# Patient Record
Sex: MALE | Race: WHITE | NOT HISPANIC OR LATINO | Employment: OTHER | URBAN - NONMETROPOLITAN AREA
[De-identification: names, ages, dates, MRNs, and addresses within clinical notes are randomized per-mention and may not be internally consistent; named-entity substitution may affect disease eponyms.]

---

## 2022-04-20 ENCOUNTER — OFFICE VISIT (OUTPATIENT)
Dept: SURGERY | Facility: CLINIC | Age: 82
End: 2022-04-20
Attending: SURGERY
Payer: MEDICARE

## 2022-04-20 VITALS
HEART RATE: 79 BPM | OXYGEN SATURATION: 97 % | RESPIRATION RATE: 18 BRPM | TEMPERATURE: 98 F | HEIGHT: 72 IN | WEIGHT: 231 LBS | DIASTOLIC BLOOD PRESSURE: 49 MMHG | BODY MASS INDEX: 31.29 KG/M2 | SYSTOLIC BLOOD PRESSURE: 142 MMHG

## 2022-04-20 DIAGNOSIS — Z12.11 SCREENING FOR MALIGNANT NEOPLASM OF COLON: Primary | ICD-10-CM

## 2022-04-20 PROCEDURE — 99204 OFFICE O/P NEW MOD 45 MIN: CPT | Mod: S$PBB,,, | Performed by: SURGERY

## 2022-04-20 PROCEDURE — 99204 OFFICE O/P NEW MOD 45 MIN: CPT | Mod: PBBFAC | Performed by: SURGERY

## 2022-04-20 PROCEDURE — 99204 PR OFFICE/OUTPT VISIT, NEW, LEVL IV, 45-59 MIN: ICD-10-PCS | Mod: S$PBB,,, | Performed by: SURGERY

## 2022-04-20 RX ORDER — AMOXICILLIN 500 MG
CAPSULE ORAL DAILY
COMMUNITY

## 2022-04-20 RX ORDER — ASCORBIC ACID 500 MG
500 TABLET ORAL DAILY
COMMUNITY

## 2022-04-20 RX ORDER — POTASSIUM CHLORIDE 20 MEQ/1
20 TABLET, EXTENDED RELEASE ORAL 2 TIMES DAILY
COMMUNITY

## 2022-04-20 RX ORDER — CHOLECALCIFEROL (VITAMIN D3) 25 MCG
1000 TABLET ORAL DAILY
COMMUNITY

## 2022-04-20 RX ORDER — ALLOPURINOL 100 MG/1
100 TABLET ORAL DAILY
COMMUNITY

## 2022-04-20 RX ORDER — VITAMIN B COMPLEX
1 CAPSULE ORAL DAILY
COMMUNITY

## 2022-04-20 RX ORDER — SODIUM CHLORIDE, SODIUM LACTATE, POTASSIUM CHLORIDE, CALCIUM CHLORIDE 600; 310; 30; 20 MG/100ML; MG/100ML; MG/100ML; MG/100ML
INJECTION, SOLUTION INTRAVENOUS CONTINUOUS
Status: CANCELLED | OUTPATIENT
Start: 2022-04-20

## 2022-04-20 RX ORDER — TRIAMTERENE AND HYDROCHLOROTHIAZIDE 75; 50 MG/1; MG/1
1 TABLET ORAL DAILY
COMMUNITY

## 2022-04-20 RX ORDER — DIGOXIN 125 MCG
125 TABLET ORAL DAILY
COMMUNITY

## 2022-04-20 RX ORDER — FELODIPINE 5 MG/1
5 TABLET, EXTENDED RELEASE ORAL DAILY
COMMUNITY

## 2022-04-20 RX ORDER — CAPTOPRIL 100 MG/1
25 TABLET ORAL 2 TIMES DAILY
COMMUNITY

## 2022-04-20 RX ORDER — DICYCLOMINE HYDROCHLORIDE 20 MG/1
20 TABLET ORAL 2 TIMES DAILY
COMMUNITY

## 2022-04-20 RX ORDER — HYDROCODONE BITARTRATE AND ACETAMINOPHEN 7.5; 325 MG/1; MG/1
1 TABLET ORAL EVERY 6 HOURS PRN
COMMUNITY

## 2022-04-20 RX ORDER — WARFARIN 7.5 MG/1
7.5 TABLET ORAL DAILY
COMMUNITY

## 2022-04-20 NOTE — PROGRESS NOTES
General Surgery History and Physical      Patient ID: Jesus Lane is a 81 y.o. male.    Chief Complaint: No chief complaint on file.      HPI:  81-year-old male who last:  A colonoscopy 5 years ago.  This was done right after he had a low anterior resection with temporary colostomy for he describes as a colorectal cancer.  Has some occasional bleeding from below likely when he strains possibly due to irritation from colitis.  No nausea or vomiting no other symptoms complaints.  No family history of colon cancer.    Review of Systems   Constitutional: Negative for activity change, appetite change, fatigue and fever.   HENT: Negative for trouble swallowing.    Respiratory: Negative for cough and shortness of breath.    Cardiovascular: Negative for chest pain and palpitations.   Gastrointestinal: Positive for anal bleeding. Negative for abdominal distention, abdominal pain, blood in stool, constipation, diarrhea and rectal pain.   Genitourinary: Negative for flank pain.   Musculoskeletal: Negative for neck pain and neck stiffness.   Neurological: Negative for weakness.       Current Outpatient Medications   Medication Sig Dispense Refill    allopurinoL (ZYLOPRIM) 100 MG tablet Take 100 mg by mouth once daily.      ascorbic acid, vitamin C, (VITAMIN C) 500 MG tablet Take 500 mg by mouth once daily.      b complex vitamins capsule Take 1 capsule by mouth once daily.      captopriL (CAPOTEN) 100 MG Tab Take 25 mg by mouth 2 (two) times daily.      dicyclomine (BENTYL) 20 mg tablet Take 20 mg by mouth 2 (two) times daily.      digoxin (LANOXIN) 125 mcg tablet Take 125 mcg by mouth once daily.      felodipine (PLENDIL) 5 MG 24 hr tablet Take 5 mg by mouth once daily.      HYDROcodone-acetaminophen (NORCO) 7.5-325 mg per tablet Take 1 tablet by mouth every 6 (six) hours as needed for Pain.      multivitamin capsule Take 1  capsule by mouth once daily.      omega-3 fatty acids/fish oil (FISH OIL-OMEGA-3 FATTY ACIDS) 300-1,000 mg capsule Take by mouth once daily.      potassium chloride SA (K-DUR,KLOR-CON) 20 MEQ tablet Take 20 mEq by mouth 2 (two) times daily.      triamterene-hydrochlorothiazide 75-50 mg (MAXZIDE) 75-50 mg per tablet Take 1 tablet by mouth once daily.      vitamin D (VITAMIN D3) 1000 units Tab Take 1,000 Units by mouth once daily.      warfarin (COUMADIN) 7.5 MG tablet Take 7.5 mg by mouth once daily.       No current facility-administered medications for this visit.       Review of patient's allergies indicates:  No Known Allergies    No past medical history on file.    No past surgical history on file.    No family history on file.    Social History     Tobacco Use    Smoking status: Never Smoker    Smokeless tobacco: Never Used   Substance and Sexual Activity    Alcohol use: Yes     Alcohol/week: 2.0 standard drinks     Types: 2 Shots of liquor per week    Drug use: Never       Vitals:    04/20/22 1459   BP: (!) 142/49   Pulse: 79   Resp: 18   Temp: 97.9 °F (36.6 °C)       Physical Exam  Constitutional:       General: He is not in acute distress.  HENT:      Head: Normocephalic.   Cardiovascular:      Rate and Rhythm: Normal rate and regular rhythm.      Pulses: Normal pulses.   Pulmonary:      Effort: Pulmonary effort is normal. No respiratory distress.      Breath sounds: Normal breath sounds.   Abdominal:      General: Abdomen is flat. There is no distension.      Palpations: Abdomen is soft.      Tenderness: There is no abdominal tenderness.   Musculoskeletal:         General: Normal range of motion.   Skin:     General: Skin is warm.   Neurological:      General: No focal deficit present.      Mental Status: He is oriented to person, place, and time.         Assessment & Plan:    Screening for malignant neoplasm of colon         Patient to go to the operating room for a colonoscopy. Risks and benefits  explained to the patient including risk of bleeding, infection, missed lesion, perforation, and possible need for additional operation. All questions were answered.

## 2022-05-16 ENCOUNTER — LAB VISIT (OUTPATIENT)
Dept: LAB | Facility: HOSPITAL | Age: 82
End: 2022-05-16
Attending: SURGERY
Payer: MEDICARE

## 2022-05-16 DIAGNOSIS — Z12.11 SCREENING FOR MALIGNANT NEOPLASM OF COLON: ICD-10-CM

## 2022-05-16 LAB — SARS-COV+SARS-COV-2 AG RESP QL IA.RAPID: NEGATIVE

## 2022-05-16 PROCEDURE — 87426 SARSCOV CORONAVIRUS AG IA: CPT

## 2022-05-18 ENCOUNTER — ANESTHESIA EVENT (OUTPATIENT)
Dept: GASTROENTEROLOGY | Facility: HOSPITAL | Age: 82
End: 2022-05-18
Payer: MEDICARE

## 2022-05-18 ENCOUNTER — ANESTHESIA (OUTPATIENT)
Dept: GASTROENTEROLOGY | Facility: HOSPITAL | Age: 82
End: 2022-05-18
Payer: MEDICARE

## 2022-05-18 ENCOUNTER — HOSPITAL ENCOUNTER (OUTPATIENT)
Dept: GASTROENTEROLOGY | Facility: HOSPITAL | Age: 82
Discharge: HOME | End: 2022-05-18
Attending: SURGERY
Payer: MEDICARE

## 2022-05-18 VITALS
WEIGHT: 235 LBS | RESPIRATION RATE: 14 BRPM | OXYGEN SATURATION: 99 % | SYSTOLIC BLOOD PRESSURE: 111 MMHG | HEIGHT: 72 IN | HEART RATE: 66 BPM | TEMPERATURE: 98 F | BODY MASS INDEX: 31.83 KG/M2 | DIASTOLIC BLOOD PRESSURE: 55 MMHG

## 2022-05-18 DIAGNOSIS — Z12.11 SCREENING FOR MALIGNANT NEOPLASM OF COLON: ICD-10-CM

## 2022-05-18 PROCEDURE — 25000003 PHARM REV CODE 250: Performed by: NURSE ANESTHETIST, CERTIFIED REGISTERED

## 2022-05-18 PROCEDURE — G0121 COLON CA SCRN NOT HI RSK IND: HCPCS

## 2022-05-18 PROCEDURE — G0105 COLORECTAL SCRN; HI RISK IND: HCPCS | Mod: ,,, | Performed by: SURGERY

## 2022-05-18 PROCEDURE — G0105 COLORECTAL SCRN; HI RISK IND: HCPCS

## 2022-05-18 PROCEDURE — 37000008 HC ANESTHESIA 1ST 15 MINUTES

## 2022-05-18 PROCEDURE — D9220A PRA ANESTHESIA: ICD-10-PCS | Mod: ,,, | Performed by: NURSE ANESTHETIST, CERTIFIED REGISTERED

## 2022-05-18 PROCEDURE — 63600175 PHARM REV CODE 636 W HCPCS: Performed by: SURGERY

## 2022-05-18 PROCEDURE — D9220A PRA ANESTHESIA: Mod: ,,, | Performed by: NURSE ANESTHETIST, CERTIFIED REGISTERED

## 2022-05-18 PROCEDURE — G0105 COLORECTAL SCRN; HI RISK IND: ICD-10-PCS | Mod: ,,, | Performed by: SURGERY

## 2022-05-18 PROCEDURE — 63600175 PHARM REV CODE 636 W HCPCS: Performed by: NURSE ANESTHETIST, CERTIFIED REGISTERED

## 2022-05-18 RX ORDER — LIDOCAINE HYDROCHLORIDE 20 MG/ML
INJECTION, SOLUTION EPIDURAL; INFILTRATION; INTRACAUDAL; PERINEURAL
Status: DISCONTINUED | OUTPATIENT
Start: 2022-05-18 | End: 2022-05-18

## 2022-05-18 RX ORDER — PROPOFOL 10 MG/ML
VIAL (ML) INTRAVENOUS
Status: DISCONTINUED | OUTPATIENT
Start: 2022-05-18 | End: 2022-05-18

## 2022-05-18 RX ORDER — SODIUM CHLORIDE, SODIUM LACTATE, POTASSIUM CHLORIDE, CALCIUM CHLORIDE 600; 310; 30; 20 MG/100ML; MG/100ML; MG/100ML; MG/100ML
INJECTION, SOLUTION INTRAVENOUS CONTINUOUS
Status: DISCONTINUED | OUTPATIENT
Start: 2022-05-18 | End: 2022-05-19 | Stop reason: HOSPADM

## 2022-05-18 RX ADMIN — PROPOFOL 100 MG: 10 INJECTION, EMULSION INTRAVENOUS at 01:05

## 2022-05-18 RX ADMIN — PROPOFOL 50 MG: 10 INJECTION, EMULSION INTRAVENOUS at 01:05

## 2022-05-18 RX ADMIN — LIDOCAINE HYDROCHLORIDE 50 MG: 20 INJECTION, SOLUTION EPIDURAL; INFILTRATION; INTRACAUDAL; PERINEURAL at 01:05

## 2022-05-18 RX ADMIN — SODIUM CHLORIDE, POTASSIUM CHLORIDE, SODIUM LACTATE AND CALCIUM CHLORIDE: 600; 310; 30; 20 INJECTION, SOLUTION INTRAVENOUS at 11:05

## 2022-05-18 NOTE — TRANSFER OF CARE
Anesthesia Transfer of Care Note    Patient: Jesus Lane    Procedure(s) Performed: * colonoscopy *    Patient location: GI    Anesthesia Type: general    Transport from OR: Transported from OR on room air with adequate spontaneous ventilation    Post pain: adequate analgesia    Post assessment: no apparent anesthetic complications    Post vital signs: stable    Level of consciousness: responds to stimulation    Nausea/Vomiting: no nausea/vomiting    Complications: none    Transfer of care protocol was followed      Last vitals:   Visit Vitals  BP (!) 104/55   Pulse 79   Temp 36.5 °C (97.7 °F)   Resp 16   Ht 6' (1.829 m)   Wt 106.6 kg (235 lb)   SpO2 100%   BMI 31.87 kg/m²

## 2022-05-18 NOTE — H&P
General Surgery History and Physical        Patient ID: Jesus Lane is a 81 y.o. male.     Chief Complaint: No chief complaint on file.        HPI:  81-year-old male who last:  A colonoscopy 5 years ago.  This was done right after he had a low anterior resection with temporary colostomy for he describes as a colorectal cancer.  Has some occasional bleeding from below likely when he strains possibly due to irritation from colitis.  No nausea or vomiting no other symptoms complaints.  No family history of colon cancer.     Review of Systems   Constitutional: Negative for activity change, appetite change, fatigue and fever.   HENT: Negative for trouble swallowing.    Respiratory: Negative for cough and shortness of breath.    Cardiovascular: Negative for chest pain and palpitations.   Gastrointestinal: Positive for anal bleeding. Negative for abdominal distention, abdominal pain, blood in stool, constipation, diarrhea and rectal pain.   Genitourinary: Negative for flank pain.   Musculoskeletal: Negative for neck pain and neck stiffness.   Neurological: Negative for weakness.         Current Medications          Current Outpatient Medications   Medication Sig Dispense Refill    allopurinoL (ZYLOPRIM) 100 MG tablet Take 100 mg by mouth once daily.        ascorbic acid, vitamin C, (VITAMIN C) 500 MG tablet Take 500 mg by mouth once daily.        b complex vitamins capsule Take 1 capsule by mouth once daily.        captopriL (CAPOTEN) 100 MG Tab Take 25 mg by mouth 2 (two) times daily.        dicyclomine (BENTYL) 20 mg tablet Take 20 mg by mouth 2 (two) times daily.        digoxin (LANOXIN) 125 mcg tablet Take 125 mcg by mouth once daily.        felodipine (PLENDIL) 5 MG 24 hr tablet Take 5 mg by mouth once daily.        HYDROcodone-acetaminophen (NORCO) 7.5-325 mg per tablet Take 1 tablet by mouth every 6 (six) hours as needed for Pain.        multivitamin capsule Take 1 capsule by mouth once daily.         omega-3 fatty acids/fish oil (FISH OIL-OMEGA-3 FATTY ACIDS) 300-1,000 mg capsule Take by mouth once daily.        potassium chloride SA (K-DUR,KLOR-CON) 20 MEQ tablet Take 20 mEq by mouth 2 (two) times daily.        triamterene-hydrochlorothiazide 75-50 mg (MAXZIDE) 75-50 mg per tablet Take 1 tablet by mouth once daily.        vitamin D (VITAMIN D3) 1000 units Tab Take 1,000 Units by mouth once daily.        warfarin (COUMADIN) 7.5 MG tablet Take 7.5 mg by mouth once daily.          No current facility-administered medications for this visit.            Review of patient's allergies indicates:  No Known Allergies     No past medical history on file.     No past surgical history on file.     No family history on file.     Social History               Tobacco Use    Smoking status: Never Smoker    Smokeless tobacco: Never Used   Substance and Sexual Activity    Alcohol use: Yes       Alcohol/week: 2.0 standard drinks       Types: 2 Shots of liquor per week    Drug use: Never                Vitals:     04/20/22 1459   BP: (!) 142/49   Pulse: 79   Resp: 18   Temp: 97.9 °F (36.6 °C)         Physical Exam  Constitutional:       General: He is not in acute distress.  HENT:      Head: Normocephalic.   Cardiovascular:      Rate and Rhythm: Normal rate and regular rhythm.      Pulses: Normal pulses.   Pulmonary:      Effort: Pulmonary effort is normal. No respiratory distress.      Breath sounds: Normal breath sounds.   Abdominal:      General: Abdomen is flat. There is no distension.      Palpations: Abdomen is soft.      Tenderness: There is no abdominal tenderness.   Musculoskeletal:         General: Normal range of motion.   Skin:     General: Skin is warm.   Neurological:      General: No focal deficit present.      Mental Status: He is oriented to person, place, and time.          Assessment & Plan:     Screening for malignant neoplasm of colon          Patient to go to the operating room for a colonoscopy.  Risks and benefits explained to the patient including risk of bleeding, infection, missed lesion, perforation, and possible need for additional operation. All questions were answered.

## 2022-05-18 NOTE — ANESTHESIA PREPROCEDURE EVALUATION
05/18/2022  Jesus Lane is a 81 y.o., male.      Pre-op Assessment    I have reviewed the Patient Summary Reports.     I have reviewed the Nursing Notes. I have reviewed the NPO Status.   I have reviewed the Medications.     Review of Systems  Anesthesia Hx:  No problems with previous Anesthesia    Social:  Alcohol Use    Hematology/Oncology:  Hematology Normal       -- Cancer in past history:  Other (see Oncology comments) chemotherapy and surgery  Oncology Comments: Hx colon ca     EENT/Dental:EENT/Dental Normal   Cardiovascular:   Hypertension, well controlled Dysrhythmias atrial fibrillation    Pulmonary:  Pulmonary Normal    Renal/:  Renal/ Normal     Hepatic/GI:   Bowel Prep.    Musculoskeletal:   Arthritis     Neurological:   Chronic Pain Syndrome   Endocrine:  Obesity / BMI > 30  Psych:  Psychiatric Normal         Anticoagulant long-term use Hypertension   Paroxysmal atrial fibrillation Arthritis       Surgical History    AMPUTATION OF REPLICATED TOES COLON SURGERY         Physical Exam  General: Well nourished, Cooperative, Alert and Oriented    Airway:  Mallampati: II   Mouth Opening: Normal  TM Distance: Normal  Tongue: Normal  Neck ROM: Normal ROM    Dental:  Intact        Anesthesia Plan  Type of Anesthesia, risks & benefits discussed:    Anesthesia Type: Gen Natural Airway  Intra-op Monitoring Plan: Standard ASA Monitors  Post Op Pain Control Plan: multimodal analgesia  Induction:  IV  Informed Consent: Informed consent signed with the Patient and all parties understand the risks and agree with anesthesia plan.  All questions answered. Patient consented to blood products? Yes  ASA Score: 3  Day of Surgery Review of History & Physical: I have interviewed and examined the patient. I have reviewed the patient's H&P dated:     Ready For Surgery From Anesthesia Perspective.     .

## 2022-05-18 NOTE — ANESTHESIA POSTPROCEDURE EVALUATION
Anesthesia Post Evaluation    Patient: Jesus Lane    Procedure(s) Performed: *colonoscopy *    Final Anesthesia Type: general      Patient location during evaluation: GI PACU  Patient participation: Yes- Able to Participate  Level of consciousness: awake and alert  Post-procedure vital signs: reviewed and stable  Pain management: adequate  Airway patency: patent    PONV status at discharge: No PONV  Anesthetic complications: no      Cardiovascular status: blood pressure returned to baseline and hemodynamically stable  Respiratory status: spontaneous ventilation  Hydration status: euvolemic  Follow-up not needed.          Vitals Value Taken Time   /55 05/18/22 1355   Temp 97.6f 05/18/22 1420   Pulse 66 05/18/22 1355   Resp 14 05/18/22 1355   SpO2 99 % 05/18/22 1355         Event Time   Out of Recovery 14:01:00         Pain/Brad Score: Brad Score: 10 (5/18/2022  2:00 PM)

## 2022-05-18 NOTE — PLAN OF CARE
1331  Pt to pacu pt resp reg and non labored pt pain level 0 sao2 99% on ra   1400 pt vs stable pt up to side of bed to get dressed pt to exit via wheelchair pt release to family

## 2023-04-19 DIAGNOSIS — R26.89 BALANCE PROBLEMS: ICD-10-CM

## 2023-04-19 DIAGNOSIS — R26.9 GAIT DIFFICULTY: Primary | ICD-10-CM

## 2023-04-26 ENCOUNTER — CLINICAL SUPPORT (OUTPATIENT)
Dept: REHABILITATION | Facility: HOSPITAL | Age: 83
End: 2023-04-26
Payer: MEDICARE

## 2023-04-26 DIAGNOSIS — R26.9 GAIT DIFFICULTY: ICD-10-CM

## 2023-04-26 DIAGNOSIS — R26.89 BALANCE PROBLEMS: ICD-10-CM

## 2023-04-26 PROCEDURE — 97161 PT EVAL LOW COMPLEX 20 MIN: CPT | Mod: PN

## 2023-04-26 PROCEDURE — 97110 THERAPEUTIC EXERCISES: CPT | Mod: PN

## 2023-04-26 NOTE — PLAN OF CARE
[facility]   Physical Therapy Initial Evaluation    Date: 4/26/2023   Name: Jesus Lane  Clinic Number: [id number]    Therapy Diagnosis:   Encounter Diagnoses   Name Primary?    Gait difficulty     Balance problems      Physician: Vania Herbert MD    Physician Orders: PT Eval and Treat    Medical Diagnosis from Referral: gait and balance disturbance.   Evaluation Date: 4/26/2023  Updated Plan of Care Due : 6/14/2023  Authorization Period Expiration: medicare   Plan of Care Expiration: end of year   Visit # / Visits authorized: 1/ 20    Time In: 1300  Time Out: 1400  Total Appointment Time (timed & untimed codes): 50 minutes    Precautions: Standard    Subjective   Date of onset: pt states he has been having balance and weakness problems . Pt voices one fall in the last month when going to the bathroom.  Pt states he has difficulty clearing his toes when walking. Pt states  difficulty with walking down hills and up hills. Pt voices he has neuropathy and has weakness in his ankles .  Pt voices he has been using a walking stick about 2 years ago.  Pt voices no pain except left hip and shoulder arthritis pain.      History of current condition - JESUS reports: hx below      Medical History:   Past Medical History:   Diagnosis Date    Anticoagulant long-term use     Arthritis     Hypertension     Paroxysmal atrial fibrillation        Surgical History:   Jesus Lane  has a past surgical history that includes Amputation of replicated toes and Colon surgery.    Medications:   Jesus has a current medication list which includes the following prescription(s): allopurinol, ascorbic acid (vitamin c), b complex vitamins, captopril, dicyclomine, digoxin, felodipine, hydrocodone-acetaminophen, multivitamin, fish oil-omega-3 fatty acids, potassium chloride sa, triamterene-hydrochlorothiazide 75-50 mg, vitamin d, and warfarin.    Allergies:   Review of patient's allergies indicates:  No Known Allergies     Imaging,  none:      Prior Therapy: none   Social History:   lives with their spouse  Occupation: retired   Prior Level of Function: independent with cane .    Current Level of Function: pt states he has decreased range of motion and strength with decreased balance .      Pain:  Current 0/10, worst 0/10, best 0/10   Location: bilateral lower extremity strength        Description: n/a   Aggravating Factors: Sitting, Standing, Walking, and decreased balance   Easing Factors: rest    Patients goals: be able to go camping and not have difficulty with gait     Objective         Observation :              Range of Motion/Strength :                  Left Extremity                                                                        Right Extremity   AROM PROM Strength  Location  AROM    PROM   Strength   90  3+   Hip      Flexion 90  3+   -10  3               Extension -10  3   25  3               Internal Rotation (Prone) 25  3   40  3               External Rotation (Prone) 40  3   8  3               Abduction 8  3                     125  3+   Knee    Flexion 125  3+   -10  3+                Extension quad lag  -10  3+   -6 5 3   Ankle   Dorsiflexion -8 5 3   40  3                Plantar Flexion 40  3   15  3                Inversion 15  3   10  3                Eversion 10  3                  Functional Impairments :  decreased knee range of motion and strength      Limitation/Restriction for FOTO knee Survey    Therapist reviewed FOTO scores for Kellie Lane on 4/26/2023.   FOTO documents entered into [employer] - see Media section.    Limitation Score: 47%         TREATMENT         KELLIE received the treatments listed below:  THERAPEUTIC EXERCISES to develop strength, endurance, ROM, flexibility, and posture for 20 minutes including home ex program         Home Exercises and Patient Education Provided    Education provided:   - Pt performed and received home ex program.     Written Home Exercises Provided: yes.  Exercises were  reviewed and KELLIE was able to demonstrate them prior to the end of the session.  KELLIE demonstrated good  understanding of the education provided.     See EMR under Patient Instructions for exercises provided prior visit.    Assessment   Kellie is a 82 y.o. male referred to outpatient Physical Therapy with a medical diagnosis of decreased balance and strength . Patient presents with decreased hip and ankle strength.     Patient prognosis is Excellent.   Patientt will benefit from skilled outpatient Physical Therapy to address the deficits stated above and in the chart below, provide patient /family education, and to maximize patientt's level of independence.     Plan of care discussed with patient: Yes  Patient's spiritual, cultural and educational needs considered and patient is agreeable to the plan of care and goals as stated below:     Anticipated Barriers for therapy: decreased balance and strength . Patient presents with decreased hip and ankle strength.   Goals:  Short Term Goals: 4 weeks   Pt will increase bilateral dorsiflexion 10 degrees   Pt will be able to increase bilateral hip abduction to 10 degrees   Pt will be able to perform unilateral standing 5 sec independently   Increase hip extension to 10 degrees bilaterally  Increase hip and dorsiflexion strength to 3+5      Long Term Goals: 8 weeks   Pt will be able to amublate/ stand 30 min without fatigue   Be able to shop x 45 min     Plan   Plan of care Certification: 4/26/2023 to 6/14/2023.    Outpatient Physical Therapy 2 times weekly for 6 weeks to include the following interventions: Manual Therapy, Patient Education, Therapeutic Activities, and Therapeutic Exercise. , neuromuscular re education    Plan of care has been reestablished with Daphnie Nur LPTA, Jess Mayers LPTA, Kelsea Palacios LPTA  and Jasmina Winkler LPTA.       Uri Conway, PT            I CERTIFY THE NEED FOR THESE SERVICES FURNISHED UNDER THIS PLAN OF TREATMENT AND WHILE UNDER MY  CARE.    Physician's comments:      Physician's Signature: ___________________________________________________

## 2023-04-28 ENCOUNTER — CLINICAL SUPPORT (OUTPATIENT)
Dept: REHABILITATION | Facility: HOSPITAL | Age: 83
End: 2023-04-28
Payer: MEDICARE

## 2023-04-28 DIAGNOSIS — R26.89 BALANCE PROBLEMS: Primary | ICD-10-CM

## 2023-04-28 PROCEDURE — 97110 THERAPEUTIC EXERCISES: CPT | Mod: PN

## 2023-04-28 NOTE — PROGRESS NOTES
Physical Therapy Treatment Note     Name: Jesus Lane  Clinic Number: [id number]    Therapy Diagnosis:   Encounter Diagnosis   Name Primary?    Balance problems Yes     Physician: Vania Herbert MD    Visit Date: 4/28/2023  Physician Orders: PT Eval and Treat    Medical Diagnosis from Referral: gait and balance disturbance.   Evaluation Date: 4/26/2023  Updated Plan of Care Due : 6/14/2023  Authorization Period Expiration: medicare   Plan of Care Expiration: end of year   Visit # / Visits authorized: 1/ 20  PTA Visit #: 0    Time In: 1428  Time Out: 1515  Total Billable Time: 45 minutes    Precautions: Standard       Subjective     Pt reports: pt voices he was a little sore after doing home ex program .  He was compliant with home exercise program.       Pain: 2/10  Location: bilateral lower extremity neuropathy       Objective     JESUS received therapeutic exercises to develop strength, endurance, ROM, flexibility, and posture for 45  minutes including:     Bike  Level                                                                    X 15minutes  Total gym level 10   Double support  Leg press                20 reps  Total gym level 10      Double support calf press                     20 reps  Bilateral leg press                                                                     70 lb x 25 reps  Bilateral calf press                                                                    70 lb x 20 reps  Hip flexion x 10 reps bilateral 20lb   Hip abduction x 10 reps  bilateral  20lb    Bilateral ankle dorisflexion x 10 reps tband red   Bilateral    eversion x 10reps      tband   Bilateral inversion x qo reps red tband                Prone ext x 10 reps   Iliopsoas stretch x 5 reps bilateral                                                  ROM    dorsiflexion       right    10   left    8               Inversion           right    25       left   15                         Hip flexion     right  98    left   93    :             Home Exercises Provided and Patient Education Provided     Education provided: cont home ex program     Written Home Exercises Provided: Patient instructed to cont prior HEP.  Exercises were reviewed and KELLIE was able to demonstrate them prior to the end of the session.  KELLIE demonstrated good  understanding of the education provided.     See EMR under Patient Instructions for exercises provided prior visit.    Assessment     Pt has improved range of motion today   KELLIE Is progressing well towards his goals.   Pt prognosis is Excellent.     Pt will continue to benefit from skilled outpatient physical therapy to address the deficits listed in the problem list box on initial evaluation, provide pt/family education and to maximize pt's level of independence in the home and community environment.     Pt's spiritual, cultural and educational needs considered and pt agreeable to plan of care and goals.     Anticipated Barriers for therapy: decreased balance and strength . Patient presents with decreased hip and ankle strength.   Goals:  Short Term Goals: 4 weeks   Pt will increase bilateral dorsiflexion 10 degrees   Pt will be able to increase bilateral hip abduction to 10 degrees   Pt will be able to perform unilateral standing 5 sec independently   Increase hip extension to 10 degrees bilaterally  Increase hip and dorsiflexion strength to 3+5        Long Term Goals: 8 weeks   Pt will be able to amublate/ stand 30 min without fatigue   Be able to shop x 45 min      Plan   Plan of care Certification: 4/26/2023 to 6/14/2023.     Outpatient Physical Therapy 2 times weekly for 6 weeks to include the following interventions: Manual Therapy, Patient Education, Therapeutic Activities, and Therapeutic Exercise. , neuromuscular re education     Plan of care has been reestablished with Daphnie Nur LPTA, Jess Mayers LPTA, Kelsea Palacios LPTA  and Jasmina Winkler LPTA.      Uri Conway, PT  4/28/2023

## 2023-05-03 ENCOUNTER — CLINICAL SUPPORT (OUTPATIENT)
Dept: REHABILITATION | Facility: HOSPITAL | Age: 83
End: 2023-05-03
Payer: MEDICARE

## 2023-05-03 DIAGNOSIS — R26.89 BALANCE PROBLEMS: Primary | ICD-10-CM

## 2023-05-03 PROCEDURE — 97110 THERAPEUTIC EXERCISES: CPT | Mod: PN,CQ

## 2023-05-03 NOTE — PROGRESS NOTES
"  Physical Therapy Treatment Note     Name: Jesus Lane  Clinic Number: [id number]    Therapy Diagnosis:   Encounter Diagnosis   Name Primary?    Balance problems Yes     Physician: Vania Herbert MD    Visit Date: 5/3/2023  Physician Orders: PT Eval and Treat    Medical Diagnosis from Referral: gait and balance disturbance.   Evaluation Date: 4/26/2023  Updated Plan of Care Due : 6/14/2023  Authorization Period Expiration: medicare   Plan of Care Expiration: end of year   Visit # / Visits authorized: 3/ 20  PTA Visit #: 1    Time In: 1015  Time Out: 1100  Total Billable Time: 45 minutes     Precautions: Standard     Received Plan of Care per Uri Conway PT     Subjective     Pt reports: no complaints  He was compliant with home exercise program.       Pain: 0/10  Location: bilateral lower extremity neuropathy       Objective     JESUS received therapeutic exercises to develop strength, endurance, ROM, flexibility, and posture for 45  minutes including:  Bike 5.5 minutes   Bilateral leg press    70 lb x 20 reps  Bilateral calf press    70 lb x 20 repetitions  Slant board bilateral calf stretch x 2 minutes   Hamstring stretch on step, 3x20 second hold   6" step ups right and left lower extremities, forward and lateral x 15 repetitions each  Standing bilateral lower extremity exercises x 15 repetitions each:    Marching, mini squats, heel/toe raises, hip abduction, hip extension   Single leg stance practice right and left x 2 minutes total with intermittent rest breaks     (Not Today)  Hip flexion x 10 reps bilateral 20lb   Hip abduction x 10 reps  bilateral  20lb    Bilateral ankle dorisflexion x 10 reps tband red   Bilateral    eversion x 10reps      tband   Bilateral inversion x qo reps red tband                Prone ext x 10 reps   Iliopsoas stretch x 5 reps bilateral                                                 Range of motion measures:    dorsiflexion       right    10   left   12  Inversion           right   "  30       left   30  Hip flexion     right  110    left   110  Hamstring  Right -20    Left -25       Home Exercises Provided and Patient Education Provided     Education provided: standing BLE exercises as noted above    Written Home Exercises Provided: Patient instructed to cont prior HEP.  Exercises were reviewed and KELLIE was able to demonstrate them prior to the end of the session.  KELLIE demonstrated good  understanding of the education provided.     Assessment     Improving range of motion as noted above  KELLIE Is progressing well towards his goals.   Pt prognosis is Excellent.     Pt will continue to benefit from skilled outpatient physical therapy to address the deficits listed in the problem list box on initial evaluation, provide pt/family education and to maximize pt's level of independence in the home and community environment.      Anticipated Barriers for therapy: home exercise program compliance    Goals:  Short Term Goals: 4 weeks   Pt will increase bilateral dorsiflexion 10 degrees   Pt will be able to increase bilateral hip abduction to 10 degrees   Pt will be able to perform unilateral standing 5 sec independently   Increase hip extension to 10 degrees bilaterally  Increase hip and dorsiflexion strength to 3+5     Long Term Goals: 8 weeks   Pt will be able to amublate/ stand 30 min without fatigue   Be able to shop x 45 min      Plan     Plan of care Certification: 4/26/2023 to 6/14/2023.     Outpatient Physical Therapy 2 times weekly for 6 weeks to include the following interventions: Manual Therapy, Patient Education, Therapeutic Activities, and Therapeutic Exercise. , neuromuscular re education     Continue per Plan of Care and progress as pt able   Jess Mayers, PTA  5/3/2023

## 2023-05-05 ENCOUNTER — CLINICAL SUPPORT (OUTPATIENT)
Dept: REHABILITATION | Facility: HOSPITAL | Age: 83
End: 2023-05-05
Payer: MEDICARE

## 2023-05-05 DIAGNOSIS — R26.89 BALANCE PROBLEMS: Primary | ICD-10-CM

## 2023-05-05 PROCEDURE — 97110 THERAPEUTIC EXERCISES: CPT | Mod: PN,CQ

## 2023-05-05 NOTE — PROGRESS NOTES
"  Physical Therapy Treatment Note     Name: Jesus Lane  Clinic Number: [id number]    Therapy Diagnosis:   Encounter Diagnosis   Name Primary?    Balance problems Yes     Physician: Vania Herbert MD    Visit Date: 5/5/2023  Physician Orders: PT Eval and Treat    Medical Diagnosis from Referral: gait and balance disturbance.   Evaluation Date: 4/26/2023  Updated Plan of Care Due : 6/14/2023  Authorization Period Expiration: medicare   Plan of Care Expiration: end of year   Visit # / Visits authorized: 4/ 20  PTA Visit #: 2    Time In: 1015  Time Out: 1050  Total Billable Time: 35 minutes     Precautions: Standard      Subjective     Pt reports: achy in left hip due to weather  He was compliant with home exercise program.       Pain: 0/10  Location: bilateral lower extremity neuropathy       Objective     JESUS received therapeutic exercises to develop strength, endurance, ROM, flexibility, and posture for 35  minutes including:  Bike 5 minutes   Bilateral leg press    90 lb x 20 reps  Bilateral calf press    90 lb x 15 repetitions  Slant board bilateral calf stretch x 2 minutes, then again at treatment end   Hamstring stretch on step, 3x20 second hold   6" step ups right and left lower extremities, forward x 15 repetitions each  Standing bilateral lower extremity exercises x 15 repetitions each:    Marching, mini squats, heel/toe raises, hip abduction, hip extension   Sit to stand x 10 repetitions   Seated and standing head nods x 6 repetitions each  Side stepping left and right 12ft x 2 each way with HHA  Toe walking, heel walking at 12ft x 2 each with HHA    (Not Today)  Hip flexion x 10 reps bilateral 20lb   Hip abduction x 10 reps  bilateral  20lb    Bilateral ankle dorisflexion x 10 reps tband red   Bilateral    eversion x 10reps      tband   Bilateral inversion x qo reps red tband                Prone ext x 10 reps   Iliopsoas stretch x 5 reps bilateral       Single leg stance practice right and left x 2 " minutes total with intermittent rest breaks                                             Range of motion measures: (not assessed today)   dorsiflexion       right    10   left   12  Inversion           right    30       left   30  Hip flexion     right  110    left   110  Hamstring  Right -20    Left -25       Home Exercises Provided and Patient Education Provided     Education provided: continue with current home exercise program, pain free    Written Home Exercises Provided: Patient instructed to cont prior HEP.  Exercises were reviewed and KELLIE was able to demonstrate them prior to the end of the session.  KELLIE demonstrated good  understanding of the education provided.     Assessment     Pt guarded due to dizziness, and balance deficits   KELLIE Is progressing well towards his goals.   Pt prognosis is Excellent.     Pt will continue to benefit from skilled outpatient physical therapy to address the deficits listed in the problem list box on initial evaluation, provide pt/family education and to maximize pt's level of independence in the home and community environment.      Anticipated Barriers for therapy: home exercise program compliance    Goals:  Short Term Goals: 4 weeks   Pt will increase bilateral dorsiflexion 10 degrees   Pt will be able to increase bilateral hip abduction to 10 degrees   Pt will be able to perform unilateral standing 5 sec independently   Increase hip extension to 10 degrees bilaterally  Increase hip and dorsiflexion strength to 3+5     Long Term Goals: 8 weeks   Pt will be able to amublate/ stand 30 min without fatigue   Be able to shop x 45 min      Plan     Plan of care Certification: 4/26/2023 to 6/14/2023.     Outpatient Physical Therapy 2 times weekly for 6 weeks to include the following interventions: Manual Therapy, Patient Education, Therapeutic Activities, and Therapeutic Exercise. , neuromuscular re education     Continue per Plan of Care and progress as pt able   Jess LANCASTER  Talib, PTA  5/5/2023

## 2023-05-08 ENCOUNTER — CLINICAL SUPPORT (OUTPATIENT)
Dept: REHABILITATION | Facility: HOSPITAL | Age: 83
End: 2023-05-08
Payer: MEDICARE

## 2023-05-08 DIAGNOSIS — R26.89 BALANCE PROBLEMS: Primary | ICD-10-CM

## 2023-05-08 PROCEDURE — 97110 THERAPEUTIC EXERCISES: CPT | Mod: PN

## 2023-05-08 PROCEDURE — 97112 NEUROMUSCULAR REEDUCATION: CPT | Mod: PN

## 2023-05-08 NOTE — PROGRESS NOTES
"  Physical Therapy Treatment Note     Name: Jesus Lane  Clinic Number: [id number]    Therapy Diagnosis:   Encounter Diagnosis   Name Primary?    Balance problems Yes     Physician: Vania Herbert MD    Visit Date: 5/8/2023  Physician Orders: PT Eval and Treat    Medical Diagnosis from Referral: gait and balance disturbance.   Evaluation Date: 4/26/2023  Updated Plan of Care Due : 6/14/2023  Authorization Period Expiration: medicare   Plan of Care Expiration: end of year   Visit # / Visits authorized: 5/ 20  PTA Visit #: 0    Time In: 841  Time Out: 930  Total Billable Time: 45 minutes     Precautions: Standard      Subjective     Pt reports: I believe I am getting stronger   He was compliant with home exercise program.       Pain: 0/10  Location: bilateral lower extremity neuropathy       Objective     JESUS received therapeutic exercises to develop strength, endurance, ROM, flexibility, and posture for 30  minutes including:  Bike 5 minutes   Bilateral leg press    90 lb x 20 reps  Bilateral calf press    90 lb x 15 repetitions  Slant board bilateral calf stretch x 2 minutes, then again at treatment end   Hamstring stretch on step, 3x20 second hold   6" step ups right and left lower extremities, forward x 15 repetitions each     Sit to stand x 10 repetitions        Hip flexion x 10 reps bilateral 30lb   Hip abduction x 10 reps  bilateral  300lb      Neuro ed for balance     15 min   Bilateral ankle dorisflexion x 10 reps tband red   Bilateral    eversion x 10reps      tband   Bilateral inversion x qo reps red tband                Prone ext x 10 reps   Iliopsoas stretch x 5 reps bilateral       Single leg stance practice right and left x 2 minutes total with intermittent rest breaks                                             Range of motion measures:   dorsiflexion       right    -3  left   8  Inversion           right    30       left  30  Eversion           right     10       left    10  Hip flexion     right  " 110    left   110  Hamstring  Right -20    Left -25       Home Exercises Provided and Patient Education Provided     Education provided: continue with current home exercise program, pain free    Written Home Exercises Provided: Patient instructed to cont prior HEP.  Exercises were reviewed and KELLIE was able to demonstrate them prior to the end of the session.  KELLIE demonstrated good  understanding of the education provided.     Assessment     Pt guarded due to dizziness, and balance deficits   KELLIE Is progressing well towards his goals.   Pt prognosis is Excellent.     Pt will continue to benefit from skilled outpatient physical therapy to address the deficits listed in the problem list box on initial evaluation, provide pt/family education and to maximize pt's level of independence in the home and community environment.      Anticipated Barriers for therapy: home exercise program compliance    Goals:  Short Term Goals: 4 weeks   Pt will increase bilateral dorsiflexion 10 degrees   Pt will be able to increase bilateral hip abduction to 10 degrees   Pt will be able to perform unilateral standing 5 sec independently   Increase hip extension to 10 degrees bilaterally  Increase hip and dorsiflexion strength to 3+5     Long Term Goals: 8 weeks   Pt will be able to amublate/ stand 30 min without fatigue   Be able to shop x 45 min      Plan     Plan of care Certification: 4/26/2023 to 6/14/2023.     Outpatient Physical Therapy 2 times weekly for 6 weeks to include the following interventions: Manual Therapy, Patient Education, Therapeutic Activities, and Therapeutic Exercise. , neuromuscular re education     Plan of care has been reestablished with Daphnie Nur LPTA, Jess Mayers LPTA, Kelsea Palacios LPTA  and Jasmina Winkler LPTA.     Uri Conway, PT  5/8/2023

## 2023-05-10 ENCOUNTER — CLINICAL SUPPORT (OUTPATIENT)
Dept: REHABILITATION | Facility: HOSPITAL | Age: 83
End: 2023-05-10
Payer: MEDICARE

## 2023-05-10 DIAGNOSIS — R26.89 BALANCE PROBLEMS: Primary | ICD-10-CM

## 2023-05-10 PROCEDURE — 97110 THERAPEUTIC EXERCISES: CPT | Mod: PN

## 2023-05-10 PROCEDURE — 97112 NEUROMUSCULAR REEDUCATION: CPT | Mod: PN

## 2023-05-10 NOTE — PROGRESS NOTES
"  Physical Therapy Treatment Note     Name: Jesus Lane  Clinic Number: [id number]    Therapy Diagnosis:   Encounter Diagnosis   Name Primary?    Balance problems Yes     Physician: Vania Herbert MD    Visit Date: 5/10/2023  Physician Orders: PT Eval and Treat    Medical Diagnosis from Referral: gait and balance disturbance.   Evaluation Date: 4/26/2023  Updated Plan of Care Due : 6/14/2023  Authorization Period Expiration: medicare   Plan of Care Expiration: end of year   Visit # / Visits authorized: 6/ 20  PTA Visit #: 0    Time In: 928  Time Out: 1015  Total Billable Time: 45 minutes     Precautions: Standard      Subjective     Pt reports: cont to improve, only  problem is nerves with feet, they just dont want to do what I want them too.  He was compliant with home exercise program.       Pain: 0/10  Location: bilateral lower extremity neuropathy       Objective     JESUS received therapeutic exercises to develop strength, endurance, ROM, flexibility, and posture for 30  minutes including:  Bike 5 minutes   Bilateral leg press    90 lb x 20 reps  Bilateral calf press    90 lb x 15 repetitions  Slant board bilateral calf stretch x 2 minutes, then again at treatment end   Hamstring stretch on step, 3x20 second hold   6" step ups right and left lower extremities, forward x 15 repetitions each  Treadmill @ 1.1 mph   x 3 min     Sit to stand x 10 repetitions        Hip flexion x 10 reps bilateral 30lb   Hip abduction x 10 reps  bilateral  300lb      Neuro ed for balance     15 min   Bilateral ankle dorisflexion x 10 reps tband red   Bilateral    eversion x 10reps      tband   Bilateral inversion x qo reps red tband                Prone ext x 10 reps   Iliopsoas stretch x 5 reps bilateral       Single leg stance practice right and left x 2 minutes total with intermittent rest breaks                                             Range of motion measures:   dorsiflexion       right    -0  left   8  Inversion           " right    30       left  30  Eversion           right     10       left    10  Hip flexion     right  110    left   110  Hamstring  Right -20    Left -25       Home Exercises Provided and Patient Education Provided     Education provided: continue with current home exercise program, pain free    Written Home Exercises Provided: Patient instructed to cont prior HEP.  Exercises were reviewed and KELLIE was able to demonstrate them prior to the end of the session.  KELLIE demonstrated good  understanding of the education provided.     Assessment     Pt had improved dorsiflexion to neutral , improving with gait   KELLIE Is progressing well towards his goals.   Pt prognosis is Excellent.     Pt will continue to benefit from skilled outpatient physical therapy to address the deficits listed in the problem list box on initial evaluation, provide pt/family education and to maximize pt's level of independence in the home and community environment.      Anticipated Barriers for therapy: home exercise program compliance    Goals:  Short Term Goals: 4 weeks   Pt will increase bilateral dorsiflexion 10 degrees   Pt will be able to increase bilateral hip abduction to 10 degrees   Pt will be able to perform unilateral standing 5 sec independently   Increase hip extension to 10 degrees bilaterally  Increase hip and dorsiflexion strength to 3+5     Long Term Goals: 8 weeks   Pt will be able to amublate/ stand 30 min without fatigue   Be able to shop x 45 min      Plan     Plan of care Certification: 4/26/2023 to 6/14/2023.     Outpatient Physical Therapy 2 times weekly for 6 weeks to include the following interventions: Manual Therapy, Patient Education, Therapeutic Activities, and Therapeutic Exercise. , neuromuscular re education     Plan of care has been reestablished with Daphnie Nur LPTA, Jess Mayers LPTA, Kelsea Palacios LPTA  and Jasmina Winkler LPTA.     Uri Conway, PT  5/10/2023

## 2023-05-16 ENCOUNTER — CLINICAL SUPPORT (OUTPATIENT)
Dept: REHABILITATION | Facility: HOSPITAL | Age: 83
End: 2023-05-16
Payer: MEDICARE

## 2023-05-16 DIAGNOSIS — R26.89 BALANCE PROBLEMS: Primary | ICD-10-CM

## 2023-05-16 PROCEDURE — 97112 NEUROMUSCULAR REEDUCATION: CPT | Mod: PN,CQ

## 2023-05-16 PROCEDURE — 97110 THERAPEUTIC EXERCISES: CPT | Mod: PN,CQ

## 2023-05-16 NOTE — PROGRESS NOTES
"  Physical Therapy Treatment Note     Name: Jesus Lane  Clinic Number: [id number]    Therapy Diagnosis:   No diagnosis found.    Physician: Vania Herbert MD    Visit Date: 5/16/2023  Physician Orders: PT Eval and Treat    Medical Diagnosis from Referral: gait and balance disturbance.   Evaluation Date: 4/26/2023  Updated Plan of Care Due : 6/14/2023  Authorization Period Expiration: medicare   Plan of Care Expiration: end of year   Visit # / Visits authorized: 7/ 20  PTA Visit #: 1    Time In: 917  Time Out: 1008  Total Billable Time: 51 minutes     Precautions: Standard      Subjective     Pt reports: cont to improve, states balance issues come and go, reports he takes precautions to keep from falling.   He was compliant with home exercise program.       Pain: 0/10  Location: bilateral lower extremity neuropathy       Objective     JESUS received therapeutic exercises to develop strength, endurance, ROM, flexibility, and posture for 30  minutes including:  Bike 5 minutes   Bilateral leg press    90 lb x 20 reps  Bilateral calf press    90 lb x 15 repetitions  Slant board bilateral calf stretch x 2 minutes, then again at treatment end   Hamstring stretch on step, 3x20 second hold   6" step ups right and left lower extremities, forward x 15 repetitions each  Treadmill @ 1.1 mph   x 3 min     Sit to stand x 10 repetitions        Hip flexion x 10 reps bilateral 30lb   Hip abduction x 10 reps  bilateral  300lb      Neuro ed for balance    15 min   Bilateral ankle dorisflexion x 10 reps tband red   Bilateral    eversion x 10 reps red tband   Bilateral inversion x 10 reps red tband                Prone ext x 10 reps   Iliopsoas stretch x 5 reps bilateral       Single leg stance practice right and left x 2 minutes total with intermittent rest breaks                                             Range of motion measures:   dorsiflexion       right    -0  left   8  Inversion           right    30       left  30  Eversion      "      right     10       left    10  Hip flexion     right  110    left   110  Hamstring  Right -20    Left -25       Home Exercises Provided and Patient Education Provided     Education provided: continue with current home exercise program, pain free    Written Home Exercises Provided: Patient instructed to cont prior HEP.  Exercises were reviewed and KELLIE was able to demonstrate them prior to the end of the session.  KELLIE demonstrated good  understanding of the education provided.     Assessment     Pt had improved dorsiflexion to neutral , improving with gait   KELLIE Is progressing well towards his goals.   Pt prognosis is Excellent.     Pt will continue to benefit from skilled outpatient physical therapy to address the deficits listed in the problem list box on initial evaluation, provide pt/family education and to maximize pt's level of independence in the home and community environment.      Anticipated Barriers for therapy: home exercise program compliance    Goals:  Short Term Goals: 4 weeks   Pt will increase bilateral dorsiflexion 10 degrees   Pt will be able to increase bilateral hip abduction to 10 degrees   Pt will be able to perform unilateral standing 5 sec independently   Increase hip extension to 10 degrees bilaterally  Increase hip and dorsiflexion strength to 3+5     Long Term Goals: 8 weeks   Pt will be able to amublate/ stand 30 min without fatigue   Be able to shop x 45 min      Plan     Plan of care Certification: 4/26/2023 to 6/14/2023.     Outpatient Physical Therapy 2 times weekly for 6 weeks to include the following interventions: Manual Therapy, Patient Education, Therapeutic Activities, and Therapeutic Exercise. , neuromuscular re education     Plan of care has been reestablished with Daphnie Nur LPTA, Jess Mayers LPTA, Kelsea Palacios LPTA  and Jasmina Winkler LPTA.     Kelsea Palacios, PTA  5/16/2023

## 2023-05-19 ENCOUNTER — CLINICAL SUPPORT (OUTPATIENT)
Dept: REHABILITATION | Facility: HOSPITAL | Age: 83
End: 2023-05-19
Payer: MEDICARE

## 2023-05-19 DIAGNOSIS — R26.89 BALANCE PROBLEMS: Primary | ICD-10-CM

## 2023-05-19 PROCEDURE — 97110 THERAPEUTIC EXERCISES: CPT | Mod: PN

## 2023-05-19 PROCEDURE — 97112 NEUROMUSCULAR REEDUCATION: CPT | Mod: PN

## 2023-05-19 NOTE — PLAN OF CARE
"   Physical Therapy Treatment Note      Name: Jesus Lane  Clinic Number: [id number]     Therapy Diagnosis:        Encounter Diagnosis   Name Primary?    Balance problems Yes         Physician: Vania Herbert MD     Visit Date: 5/19/2023  Physician Orders: PT Eval and Treat    Medical Diagnosis from Referral: gait and balance disturbance.   Evaluation Date: 4/26/2023  Updated Plan of Care Due : 6/14/2023  Authorization Period Expiration: medicare   Plan of Care Expiration: end of year   Visit # / Visits authorized: 8/ 20  PTA Visit #:      Time In: 917  Time Out: 1008  Total Billable Time: 51 minutes      Precautions: Standard        Subjective      Pt reports: he is feeling much better , walking safer , will still cont to use cane.   He was compliant with home exercise program.        Pain: 0/10  Location: bilateral lower extremity neuropathy        Objective      JESUS received therapeutic exercises to develop strength, endurance, ROM, flexibility, and posture for 30  minutes including:  Bike 5 minutes   Bilateral leg press    90 lb x 20 reps  Bilateral calf press    90 lb x 15 repetitions  Slant board bilateral calf stretch x 2 minutes, then again at treatment end   Hamstring stretch on step, 3x20 second hold   6" step ups right and left lower extremities, forward x 15 repetitions each  Treadmill @ 1.1 mph   x 3 min     Sit to stand x 10 repetitions         Hip flexion x 10 reps bilateral 30lb   Hip abduction x 10 reps  bilateral  300lb       Neuro ed for balance    15 min   Bilateral ankle dorisflexion x 10 reps tband red   Bilateral    eversion x 10 reps red tband   Bilateral inversion x 10 reps red tband                Prone ext x 10 reps   Iliopsoas stretch x 5 reps bilateral       Single leg stance practice right and left x 2 minutes total with intermittent rest breaks                                             Range of motion measures:   dorsiflexion       right    -0  left   8  Inversion           right    " 30       left  30  Eversion           right     10       left    10  Hip flexion     right  110    left   110  Hamstring        Right -20    Left -25        Home Exercises Provided and Patient Education Provided      Education provided: continue with current home exercise program, pain free     Written Home Exercises Provided: Patient instructed to cont prior HEP.  Exercises were reviewed and KELLIE was able to demonstrate them prior to the end of the session.  KELLIE demonstrated good  understanding of the education provided.      Assessment      Pt wishes to d/c to conserve PT visits in case he needs them later.  KELLIE Is progressing well towards his goals.   Pt prognosis is Excellent.      Pt will continue to benefit from skilled outpatient physical therapy to address the deficits listed in the problem list box on initial evaluation, provide pt/family education and to maximize pt's level of independence in the home and community environment.      Anticipated Barriers for therapy: home exercise program compliance     Goals:  Short Term Goals: 4 weeks   Pt will increase bilateral dorsiflexion 10 degrees   Pt will be able to increase bilateral hip abduction to 10 degrees   Pt will be able to perform unilateral standing 5 sec independently   Increase hip extension to 10 degrees bilaterally  Increase hip and dorsiflexion strength to 3+5     Long Term Goals: 8 weeks   Pt will be able to amublate/ stand 30 min without fatigue   Be able to shop x 45 min      Plan     Outpatient Therapy Discharge Summary     Name: Kellie Lane  Clinic Number: [id number]    Therapy Diagnosis:   Encounter Diagnosis   Name Primary?    Balance problems Yes     Physician: Vania Herbert MD         Assessment    Goals:  Pt met goals     Discharge reason: Patient has completed the physician's prescription and Patient has met all of his/her goals    Plan   This patient is discharged from Physical Therapy.    Uri Conway, PT

## 2023-05-19 NOTE — PROGRESS NOTES
"  Physical Therapy Treatment Note     Name: Jesus Lane  Clinic Number: [id number]    Therapy Diagnosis:   Encounter Diagnosis   Name Primary?    Balance problems Yes       Physician: Vania Herbert MD    Visit Date: 5/19/2023  Physician Orders: PT Eval and Treat    Medical Diagnosis from Referral: gait and balance disturbance.   Evaluation Date: 4/26/2023  Updated Plan of Care Due : 6/14/2023  Authorization Period Expiration: medicare   Plan of Care Expiration: end of year   Visit # / Visits authorized: 8/ 20  PTA Visit #:     Time In: 917  Time Out: 1008  Total Billable Time: 51 minutes     Precautions: Standard      Subjective     Pt reports: he is feeling much better , walking safer , will still cont to use cane.   He was compliant with home exercise program.       Pain: 0/10  Location: bilateral lower extremity neuropathy       Objective     JESUS received therapeutic exercises to develop strength, endurance, ROM, flexibility, and posture for 30  minutes including:  Bike 5 minutes   Bilateral leg press    90 lb x 20 reps  Bilateral calf press    90 lb x 15 repetitions  Slant board bilateral calf stretch x 2 minutes, then again at treatment end   Hamstring stretch on step, 3x20 second hold   6" step ups right and left lower extremities, forward x 15 repetitions each  Treadmill @ 1.1 mph   x 3 min     Sit to stand x 10 repetitions        Hip flexion x 10 reps bilateral 30lb   Hip abduction x 10 reps  bilateral  300lb      Neuro ed for balance    15 min   Bilateral ankle dorisflexion x 10 reps tband red   Bilateral    eversion x 10 reps red tband   Bilateral inversion x 10 reps red tband                Prone ext x 10 reps   Iliopsoas stretch x 5 reps bilateral       Single leg stance practice right and left x 2 minutes total with intermittent rest breaks                                             Range of motion measures:   dorsiflexion       right    -0  left   8  Inversion           right    30       left  " 30  Eversion           right     10       left    10  Hip flexion     right  110    left   110  Hamstring  Right -20    Left -25       Home Exercises Provided and Patient Education Provided     Education provided: continue with current home exercise program, pain free    Written Home Exercises Provided: Patient instructed to cont prior HEP.  Exercises were reviewed and KELLIE was able to demonstrate them prior to the end of the session.  KELLIE demonstrated good  understanding of the education provided.     Assessment     Pt wishes to d/c to conserve PT visits in case he needs them later.  KELLIE Is progressing well towards his goals.   Pt prognosis is Excellent.     Pt will continue to benefit from skilled outpatient physical therapy to address the deficits listed in the problem list box on initial evaluation, provide pt/family education and to maximize pt's level of independence in the home and community environment.      Anticipated Barriers for therapy: home exercise program compliance    Goals:  Short Term Goals: 4 weeks   Pt will increase bilateral dorsiflexion 10 degrees   Pt will be able to increase bilateral hip abduction to 10 degrees   Pt will be able to perform unilateral standing 5 sec independently   Increase hip extension to 10 degrees bilaterally  Increase hip and dorsiflexion strength to 3+5     Long Term Goals: 8 weeks   Pt will be able to amublate/ stand 30 min without fatigue   Be able to shop x 45 min      Plan     Plan of care Certification: 4/26/2023 to 6/14/2023.     Outpatient Physical Therapy 2 times weekly for 6 weeks to include the following interventions: Manual Therapy, Patient Education, Therapeutic Activities, and Therapeutic Exercise. , neuromuscular re education     Plan of care has been reestablished with Daphnie Nur LPTA, Jess Mayers LPTA, Kelsea Palacios LPTA  and Jasmina Winkler LPTA.     Uri Conway, PT  5/19/2023

## 2023-08-11 ENCOUNTER — HOSPITAL ENCOUNTER (OUTPATIENT)
Dept: RADIOLOGY | Facility: HOSPITAL | Age: 83
Discharge: HOME | End: 2023-08-11
Attending: FAMILY MEDICINE
Payer: MEDICARE

## 2023-08-11 DIAGNOSIS — R42 DIZZINESS: ICD-10-CM

## 2023-08-11 PROCEDURE — 93880 EXTRACRANIAL BILAT STUDY: CPT | Mod: TC

## 2024-09-16 ENCOUNTER — HOSPITAL ENCOUNTER (OUTPATIENT)
Dept: RADIOLOGY | Facility: HOSPITAL | Age: 84
Discharge: HOME | End: 2024-09-16
Attending: FAMILY MEDICINE
Payer: MEDICARE

## 2024-09-16 DIAGNOSIS — R53.1 WEAKNESS GENERALIZED: ICD-10-CM

## 2024-09-16 PROCEDURE — 70450 CT HEAD/BRAIN W/O DYE: CPT | Mod: TC

## 2024-09-16 PROCEDURE — 70450 CT HEAD/BRAIN W/O DYE: CPT | Mod: 26,,, | Performed by: RADIOLOGY
